# Patient Record
(demographics unavailable — no encounter records)

---

## 2025-05-29 NOTE — HISTORY OF PRESENT ILLNESS
[6] : 6 [7] : 7 [Radiating] : radiating [Sharp] : sharp [Constant] : constant [Household chores] : household chores [Leisure] : leisure [Rest] : rest [Massage] : massage [Standing] : standing [Walking] : walking [de-identified] : Maryjo Louise 40 y/o female complaining of RT knee pain for three weeks. Pt c/o of sharp pain when bending and walking in RT knee.  Denies N/T. Pain scale 6/10. No trauma or new injury.

## 2025-05-29 NOTE — PHYSICAL EXAM
[Right] : right knee [NL (0)] : extension 0 degrees [5___] : hamstring 5[unfilled]/5 [Negative] : negative anterior draw [] : patient ambulates without assistive device [[id number]] : flexion 130 degrees

## 2025-05-29 NOTE — DISCUSSION/SUMMARY
[Medication Risks Reviewed] : Medication risks reviewed [de-identified] : General Dx Discussion The patient was advised of the diagnosis. The natural history of the pathology was explained in full to the patient in layman's terms. All questions were answered. The risks and benefits of surgical and non-surgical treatment alternatives were explained in full to the patient.  pt refused xrays  will try mobic and ice  f/u 2 weeks  Patient was given a prescription for an anti-inflammatory medication.  They will take it for the next 5-7 days and then on an as needed basis, as long as there are no medical contra-indications.  Patient is counseled on possible GI and blood pressure side effects.